# Patient Record
Sex: FEMALE | Race: WHITE | NOT HISPANIC OR LATINO | Employment: OTHER | URBAN - METROPOLITAN AREA
[De-identification: names, ages, dates, MRNs, and addresses within clinical notes are randomized per-mention and may not be internally consistent; named-entity substitution may affect disease eponyms.]

---

## 2017-01-06 ENCOUNTER — TELEPHONE (OUTPATIENT)
Dept: OBSTETRICS AND GYNECOLOGY | Facility: CLINIC | Age: 32
End: 2017-01-06

## 2017-01-06 NOTE — TELEPHONE ENCOUNTER
Pt will run out of suboxone 1/13/16 she has not found a clinic to prescribe her medication. She wanting to know if you could give her a 1 month supply?

## 2017-01-06 NOTE — TELEPHONE ENCOUNTER
Verbally spoke with Dr. Marr is willing to prescribe 1 month supply once patient is out of meds 1/13/16

## 2017-01-09 ENCOUNTER — TELEPHONE (OUTPATIENT)
Dept: OBSTETRICS AND GYNECOLOGY | Facility: CLINIC | Age: 32
End: 2017-01-09

## 2017-01-09 NOTE — TELEPHONE ENCOUNTER
----- Message from Alina Zamudio sent at 1/9/2017  2:09 PM EST -----  Contact: [phone]  NEED TO TALK TO YOU ABOUT PA AND INSURANCE      Left message to cb  4:20pm CAROLYN VELASQUEZ

## 2017-01-17 RX ORDER — SERTRALINE HYDROCHLORIDE 100 MG/1
TABLET, FILM COATED ORAL
Qty: 30 TABLET | Refills: 2 | Status: SHIPPED | OUTPATIENT
Start: 2017-01-17